# Patient Record
Sex: MALE | Race: WHITE | Employment: OTHER | URBAN - NONMETROPOLITAN AREA
[De-identification: names, ages, dates, MRNs, and addresses within clinical notes are randomized per-mention and may not be internally consistent; named-entity substitution may affect disease eponyms.]

---

## 2020-12-03 ENCOUNTER — NURSING HOME VISIT (OUTPATIENT)
Dept: FAMILY MEDICINE | Facility: CLINIC | Age: 85
End: 2020-12-03
Payer: MEDICARE

## 2020-12-03 DIAGNOSIS — J18.9 PNEUMONIA OF LEFT LOWER LOBE DUE TO INFECTIOUS ORGANISM: ICD-10-CM
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
## 2020-12-03 DIAGNOSIS — M25.552 HIP PAIN, LEFT: Primary | ICD-10-CM

## 2020-12-03 DIAGNOSIS — J43.1 PANLOBULAR EMPHYSEMA (H): ICD-10-CM

## 2020-12-03 DIAGNOSIS — Z86.16 HISTORY OF 2019 NOVEL CORONAVIRUS DISEASE (COVID-19): ICD-10-CM

## 2020-12-03 DIAGNOSIS — I10 ESSENTIAL HYPERTENSION: ICD-10-CM

## 2020-12-03 DIAGNOSIS — E11.9 TYPE 2 DIABETES MELLITUS WITHOUT COMPLICATION, WITHOUT LONG-TERM CURRENT USE OF INSULIN (H): ICD-10-CM

## 2020-12-03 DIAGNOSIS — M62.81 GENERALIZED MUSCLE WEAKNESS: ICD-10-CM

## 2020-12-03 PROCEDURE — 99306 1ST NF CARE HIGH MDM 50: CPT | Performed by: NURSE PRACTITIONER

## 2020-12-03 RX ORDER — BUDESONIDE AND FORMOTEROL FUMARATE DIHYDRATE 160; 4.5 UG/1; UG/1
2 AEROSOL RESPIRATORY (INHALATION) 2 TIMES DAILY
COMMUNITY

## 2020-12-03 RX ORDER — CARBOXYMETHYLCELLULOSE SODIUM 5 MG/ML
1 SOLUTION/ DROPS OPHTHALMIC PRN
COMMUNITY

## 2020-12-03 RX ORDER — BUDESONIDE 0.5 MG/2ML
0.5 INHALANT ORAL 2 TIMES DAILY
COMMUNITY
End: 2020-12-18

## 2020-12-03 RX ORDER — GUAIFENESIN 600 MG/1
600 TABLET, EXTENDED RELEASE ORAL 2 TIMES DAILY
COMMUNITY

## 2020-12-03 RX ORDER — SIMVASTATIN 10 MG
10 TABLET ORAL AT BEDTIME
COMMUNITY

## 2020-12-03 RX ORDER — LANOLIN ALCOHOL/MO/W.PET/CERES
6 CREAM (GRAM) TOPICAL AT BEDTIME
COMMUNITY

## 2020-12-03 RX ORDER — OXYCODONE HYDROCHLORIDE 10 MG/1
10 TABLET ORAL EVERY 6 HOURS PRN
Qty: 20 TABLET | Refills: 0 | Status: SHIPPED | OUTPATIENT
Start: 2020-12-03 | End: 2020-12-11

## 2020-12-03 RX ORDER — PREDNISONE 10 MG/1
10 TABLET ORAL
COMMUNITY

## 2020-12-03 RX ORDER — OXYCODONE HYDROCHLORIDE 10 MG/1
5 TABLET ORAL EVERY 6 HOURS PRN
COMMUNITY
End: 2020-12-03

## 2020-12-03 RX ORDER — ALBUTEROL SULFATE 90 UG/1
2 AEROSOL, METERED RESPIRATORY (INHALATION) 4 TIMES DAILY PRN
COMMUNITY

## 2020-12-03 RX ORDER — TAMSULOSIN HYDROCHLORIDE 0.4 MG/1
0.4 CAPSULE ORAL DAILY
COMMUNITY

## 2020-12-03 RX ORDER — SENNOSIDES A AND B 8.6 MG/1
1 TABLET, FILM COATED ORAL 2 TIMES DAILY PRN
COMMUNITY

## 2020-12-03 RX ORDER — DILTIAZEM HYDROCHLORIDE 180 MG/1
180 CAPSULE, EXTENDED RELEASE ORAL DAILY
COMMUNITY

## 2020-12-03 RX ORDER — ARFORMOTEROL TARTRATE 15 UG/2ML
15 SOLUTION RESPIRATORY (INHALATION) 2 TIMES DAILY
COMMUNITY
End: 2020-12-18

## 2020-12-03 RX ORDER — ACETAMINOPHEN 325 MG/1
325 TABLET ORAL PRN
COMMUNITY
End: 2020-12-11

## 2020-12-03 RX ORDER — OXYBUTYNIN CHLORIDE 5 MG/1
5 TABLET ORAL 2 TIMES DAILY
COMMUNITY
End: 2020-12-11

## 2020-12-03 RX ORDER — ALBUTEROL SULFATE 0.83 MG/ML
2.5 SOLUTION RESPIRATORY (INHALATION)
COMMUNITY
End: 2020-12-18

## 2020-12-03 ASSESSMENT — MIFFLIN-ST. JEOR: SCORE: 1296.75

## 2020-12-03 NOTE — LETTER
12/3/2020        RE: Magno Blackmon  [address]  [place] [address]        [facility]  PRIMARY CARE PROVIDER AND CLINIC:  Isha Lopez MD, [address] [place] [address] / [place] [address]  Chief Complaint   Patient presents with     NURSING HOME REQUEST     Admission     [facility] Medical Record Number:  [id number]  Place of Service where encounter took place:  [facility] [[id number]]    Magno Blackmon  is a 89 year old  (8/27/1930), admitted to the above facility from  [facility]. Hospital stay 11/28/20 through 11/30/20..  Admitted to this facility for  rehab.    HPI:    HPI information obtained from: facility chart records, facility staff and patient report.   Brief Summary of Hospital Course:   BRIEF HOSPITAL COURSE: This 89 y.o. male with a past medical history most significant for COPD, coronary disease, type 2 diabetes mellitus, hypertension, GERD, and dyslipidemia presented to the [facility] emergency room with generalized weakness. Ra was in his normal state of health until the middle of the night of presenting at which time he reports falling to his knees by his bed because he felt so weak. He was unable to get up from that position and ultimately EMS was summoned. Of note, Ra had a positive covid test on 11/17/20 and was hospitalized for a pneumonia here at [facility] from 11/16 to 11/18. Ra was saturating well on room air and did not need any supplemental oxygen. He remained quite weak, but did show steady improvement each day and subjectively felt more like himself as well. He will be discharged to [facility] in [place]. There are no new medications or medication changes for Ra.    Updates on Status Since Skilled nursing Admission:   Patient had a fall last night had- no injury initially except abrasion on bilateral knees   Denies pain then   Now today he is complaining of pain in left hip and decreased ROM   He has an order for oxycodone but no script was  sent     No other concerns expressed  He is eating well   Wants to discharge ASAP from rehab under the care of a friend Abigail     nebulizer changed to inhalers due to [employer] policy            CODE STATUS/ADVANCE DIRECTIVES DISCUSSION:   DNR / DNI  Patient's living condition: lives alone  ALLERGIES: Blood-group specific substance  PAST MEDICAL HISTORY:  has no past medical history on file.  PAST SURGICAL HISTORY:   has a past surgical history that includes REMV CATARACT EXTRACAP,INSERT LENS, W/O ECP (02/17/2005); REMV CATARACT EXTRACAP,INSERT LENS, W/O ECP (04/21/2005); rotator cuff repair rt/lt (Left); Cardiac Cath - HIM Scan (1996); Mediastinoscopy; and hernia repair (2012).  FAMILY HISTORY: family history is not on file.  SOCIAL HISTORY:       Post Discharge Medication Reconciliation Status: discharge medications reconciled, continue medications without change    Current Outpatient Medications   Medication Sig Dispense Refill     acetaminophen (TYLENOL) 325 MG tablet Take 325 mg by mouth as needed for mild pain 1 tablet for mild pain, 2 tablets for moderate pain every 4 hours as needed/max dose in 24 hours in 4000mg       albuterol (PROAIR HFA/PROVENTIL HFA/VENTOLIN HFA) 108 (90 Base) MCG/ACT inhaler Inhale 2 puffs into the lungs 4 times daily as needed for shortness of breath / dyspnea or wheezing       budesonide-formoterol (SYMBICORT) 160-4.5 MCG/ACT Inhaler Inhale 2 puffs into the lungs 2 times daily       calcium carbonate 600 mg-vitamin D 400 units (CALTRATE) 600-400 MG-UNIT per tablet Take 1 tablet by mouth 2 times daily       carboxymethylcellulose PF (REFRESH PLUS) 0.5 % ophthalmic solution Place 1 drop into both eyes as needed for dry eyes       Cholecalciferol (VITAMIN D-3) 125 MCG (5000 UT) TABS Take 5,000 Units by mouth daily       diltiazem ER (DILT-XR) 180 MG 24 hr capsule Take 180 mg by mouth daily       guaiFENesin (MUCINEX) 600 MG 12 hr tablet Take 1,200 mg by mouth 2 times daily       melatonin  "3 MG tablet Take 1 mg by mouth At Bedtime       metFORMIN (GLUCOPHAGE) 500 MG tablet Take 500 mg by mouth 2 times daily (with meals)       Multiple Vitamins-Minerals (PRESERVISION AREDS 2 PO) 2 capsules by mouth once daily       oxybutynin (DITROPAN) 5 MG tablet Take 5 mg by mouth 2 times daily       oxyCODONE IR (ROXICODONE) 10 MG tablet Take 1 tablet (10 mg) by mouth every 6 hours as needed for severe pain 20 tablet 0     predniSONE (DELTASONE) 10 MG tablet Take 10 mg by mouth daily       senna (SENOKOT) 8.6 MG tablet Take 1 tablet by mouth 2 times daily as needed for constipation       simvastatin (ZOCOR) 10 MG tablet Take 10 mg by mouth At Bedtime       tamsulosin (FLOMAX) 0.4 MG capsule Take 0.4 mg by mouth daily       albuterol (PROVENTIL) (2.5 MG/3ML) 0.083% neb solution Take 2.5 mg by nebulization every 2 hours as needed for shortness of breath / dyspnea or wheezing       arformoterol (BROVANA) 15 MCG/2ML NEBU neb solution Take 15 mcg by nebulization 2 times daily       budesonide (PULMICORT) 0.5 MG/2ML neb solution Take 0.5 mg by nebulization 2 times daily           ROS:  10 point ROS of systems including Constitutional, Eyes, Respiratory, Cardiovascular, Gastroenterology, Genitourinary, Integumentary, Musculoskeletal, Psychiatric were all negative except for pertinent positives noted in my HPI.    Vitals:  /63   Pulse 50   Temp 99  F (37.2  C)   Resp 19   Ht 1.676 m (5' 6\")   Wt 69.4 kg (153 lb)   SpO2 93%   BMI 24.69 kg/m    Exam:  GENERAL APPEARANCE:  Alert, in no distress  EYES:  EOM, conjunctivae, lids, pupils and irises normal  RESP:  respiratory effort and palpation of chest normal, lungs clear to auscultation , no respiratory distress  CV:  Palpation and auscultation of heart done , regular rate and rhythm, no murmur, rub, or gallop  ABDOMEN:  normal bowel sounds, soft, nontender, no hepatosplenomegaly or other masses  M/S:   decreased ROM of left hip due to pain   SKIN:  Inspection of " skin and subcutaneous tissue baseline, Palpation of skin and subcutaneous tissue baseline  NEURO:   Cranial nerves 2-12 are normal tested and grossly at patient's baseline  PSYCH:  oriented X 3, affect and mood normal    Lab/Diagnostic data:  Labs done in SNF are in [place] EPIC. Please refer to them using [employer]Care Everywhere.    ASSESSMENT/PLAN:  History of 2019 novel coronavirus disease (COVID-19)  Pneumonia of left lower lobe due to infectious organism  Recovered  Has post COVID weakness  Here for rehab     Generalized muscle weakness  Working with therapies     Hip pain, left  Fall, initial encounter  Will get xrayof left hip through [facility] to rule out an acute fracture   Sent RX for oxycodone at home dose   authorized this to be removed from e-kit     Type 2 diabetes mellitus without complication, without long-term current use of insulin (H)  Stable     Panlobular emphysema (H)  Stable     Pulmicort and brovana nebs on held and switched to symbicort due to policy in facility       Essential hypertension  Stable        Orders written by provider at facility  As above    Total time spent with patient visit at the skilled nursing facility was 35 min including patient visit and review of past records. Greater than 50% of total time spent with counseling and coordinating care due to new admssion.     Electronically signed by:  Kelly Abebe NP                           Sincerely,        Kelly Abebe NP

## 2020-12-03 NOTE — PROGRESS NOTES
[facility]  PRIMARY CARE PROVIDER AND CLINIC:  Isha Lopez MD, [address] / [place] [address]  Chief Complaint   Patient presents with     NURSING HOME REQUEST     Admission     [facility] Medical Record Number:  [id number]  Place of Service where encounter took place:  [facility] ([facility] [[id number]]    Magno Blackmon  is a 89 year old  (8/27/1930), admitted to the above facility from  [facility]. Hospital stay 11/28/20 through 11/30/20..  Admitted to this facility for  rehab.    HPI:    HPI information obtained from: facility chart records, facility staff and patient report.   Brief Summary of Hospital Course:   BRIEF HOSPITAL COURSE: This 89 y.o. male with a past medical history most significant for COPD, coronary disease, type 2 diabetes mellitus, hypertension, GERD, and dyslipidemia presented to the [facility] emergency room with generalized weakness. Ra was in his normal state of health until the middle of the night of presenting at which time he reports falling to his knees by his bed because he felt so weak. He was unable to get up from that position and ultimately EMS was summoned. Of note, Ra had a positive covid test on 11/17/20 and was hospitalized for a pneumonia here at [facility] from 11/16 to 11/18. Ra was saturating well on room air and did not need any supplemental oxygen. He remained quite weak, but did show steady improvement each day and subjectively felt more like himself as well. He will be discharged to [facility] in [place]. There are no new medications or medication changes for Ra.    Updates on Status Since Skilled nursing Admission:   Patient had a fall last night had- no injury initially except abrasion on bilateral knees   Denies pain then   Now today he is complaining of pain in left hip and decreased ROM   He has an order for oxycodone but no script was sent     No other concerns expressed  He is eating well   Wants to discharge ASAP from  rehab under the care of a friend Abigail     nebulizer changed to inhalers due to [employer] policy            CODE STATUS/ADVANCE DIRECTIVES DISCUSSION:   DNR / DNI  Patient's living condition: lives alone  ALLERGIES: Blood-group specific substance  PAST MEDICAL HISTORY:  has no past medical history on file.  PAST SURGICAL HISTORY:   has a past surgical history that includes REMV CATARACT EXTRACAP,INSERT LENS, W/O ECP (02/17/2005); REMV CATARACT EXTRACAP,INSERT LENS, W/O ECP (04/21/2005); rotator cuff repair rt/lt (Left); Cardiac Cath - HIM Scan (1996); Mediastinoscopy; and hernia repair (2012).  FAMILY HISTORY: family history is not on file.  SOCIAL HISTORY:       Post Discharge Medication Reconciliation Status: discharge medications reconciled, continue medications without change    Current Outpatient Medications   Medication Sig Dispense Refill     acetaminophen (TYLENOL) 325 MG tablet Take 325 mg by mouth as needed for mild pain 1 tablet for mild pain, 2 tablets for moderate pain every 4 hours as needed/max dose in 24 hours in 4000mg       albuterol (PROAIR HFA/PROVENTIL HFA/VENTOLIN HFA) 108 (90 Base) MCG/ACT inhaler Inhale 2 puffs into the lungs 4 times daily as needed for shortness of breath / dyspnea or wheezing       budesonide-formoterol (SYMBICORT) 160-4.5 MCG/ACT Inhaler Inhale 2 puffs into the lungs 2 times daily       calcium carbonate 600 mg-vitamin D 400 units (CALTRATE) 600-400 MG-UNIT per tablet Take 1 tablet by mouth 2 times daily       carboxymethylcellulose PF (REFRESH PLUS) 0.5 % ophthalmic solution Place 1 drop into both eyes as needed for dry eyes       Cholecalciferol (VITAMIN D-3) 125 MCG (5000 UT) TABS Take 5,000 Units by mouth daily       diltiazem ER (DILT-XR) 180 MG 24 hr capsule Take 180 mg by mouth daily       guaiFENesin (MUCINEX) 600 MG 12 hr tablet Take 1,200 mg by mouth 2 times daily       melatonin 3 MG tablet Take 1 mg by mouth At Bedtime       metFORMIN (GLUCOPHAGE) 500 MG tablet  "Take 500 mg by mouth 2 times daily (with meals)       Multiple Vitamins-Minerals (PRESERVISION AREDS 2 PO) 2 capsules by mouth once daily       oxybutynin (DITROPAN) 5 MG tablet Take 5 mg by mouth 2 times daily       oxyCODONE IR (ROXICODONE) 10 MG tablet Take 1 tablet (10 mg) by mouth every 6 hours as needed for severe pain 20 tablet 0     predniSONE (DELTASONE) 10 MG tablet Take 10 mg by mouth daily       senna (SENOKOT) 8.6 MG tablet Take 1 tablet by mouth 2 times daily as needed for constipation       simvastatin (ZOCOR) 10 MG tablet Take 10 mg by mouth At Bedtime       tamsulosin (FLOMAX) 0.4 MG capsule Take 0.4 mg by mouth daily       albuterol (PROVENTIL) (2.5 MG/3ML) 0.083% neb solution Take 2.5 mg by nebulization every 2 hours as needed for shortness of breath / dyspnea or wheezing       arformoterol (BROVANA) 15 MCG/2ML NEBU neb solution Take 15 mcg by nebulization 2 times daily       budesonide (PULMICORT) 0.5 MG/2ML neb solution Take 0.5 mg by nebulization 2 times daily           ROS:  10 point ROS of systems including Constitutional, Eyes, Respiratory, Cardiovascular, Gastroenterology, Genitourinary, Integumentary, Musculoskeletal, Psychiatric were all negative except for pertinent positives noted in my HPI.    Vitals:  /63   Pulse 50   Temp 99  F (37.2  C)   Resp 19   Ht 1.676 m (5' 6\")   Wt 69.4 kg (153 lb)   SpO2 93%   BMI 24.69 kg/m    Exam:  GENERAL APPEARANCE:  Alert, in no distress  EYES:  EOM, conjunctivae, lids, pupils and irises normal  RESP:  respiratory effort and palpation of chest normal, lungs clear to auscultation , no respiratory distress  CV:  Palpation and auscultation of heart done , regular rate and rhythm, no murmur, rub, or gallop  ABDOMEN:  normal bowel sounds, soft, nontender, no hepatosplenomegaly or other masses  M/S:   decreased ROM of left hip due to pain   SKIN:  Inspection of skin and subcutaneous tissue baseline, Palpation of skin and subcutaneous tissue " baseline  NEURO:   Cranial nerves 2-12 are normal tested and grossly at patient's baseline  PSYCH:  oriented X 3, affect and mood normal    Lab/Diagnostic data:  Labs done in SNF are in [place] EPIC. Please refer to them using [employer]Care Everywhere.    ASSESSMENT/PLAN:  History of 2019 novel coronavirus disease (COVID-19)  Pneumonia of left lower lobe due to infectious organism  Recovered  Has post COVID weakness  Here for rehab     Generalized muscle weakness  Working with therapies     Hip pain, left  Fall, initial encounter  Will get xrayof left hip through [facility] to rule out an acute fracture   Sent RX for oxycodone at home dose   authorized this to be removed from e-kit     Type 2 diabetes mellitus without complication, without long-term current use of insulin (H)  Stable     Panlobular emphysema (H)  Stable     Pulmicort and brovana nebs on held and switched to symbicort due to policy in facility       Essential hypertension  Stable        Orders written by provider at facility  As above    Total time spent with patient visit at the skilled nursing facility was 35 min including patient visit and review of past records. Greater than 50% of total time spent with counseling and coordinating care due to new admssion.     Electronically signed by:  Kelly Abebe NP

## 2020-12-04 VITALS
HEART RATE: 50 BPM | RESPIRATION RATE: 19 BRPM | TEMPERATURE: 99 F | SYSTOLIC BLOOD PRESSURE: 129 MMHG | WEIGHT: 153 LBS | BODY MASS INDEX: 24.59 KG/M2 | DIASTOLIC BLOOD PRESSURE: 63 MMHG | OXYGEN SATURATION: 93 % | HEIGHT: 66 IN

## 2020-12-04 PROBLEM — R53.1 GENERALIZED WEAKNESS: Status: ACTIVE | Noted: 2020-11-28

## 2020-12-04 PROBLEM — J18.9 PNEUMONIA OF LEFT LOWER LOBE DUE TO INFECTIOUS ORGANISM: Status: ACTIVE | Noted: 2020-11-17

## 2020-12-04 PROBLEM — N40.0 HYPERTROPHY OF PROSTATE WITHOUT URINARY OBSTRUCTION: Status: ACTIVE | Noted: 2020-12-04

## 2020-12-04 PROBLEM — J44.1 COPD WITH ACUTE EXACERBATION (H): Status: ACTIVE | Noted: 2019-09-17

## 2020-12-07 ENCOUNTER — NURSING HOME VISIT (OUTPATIENT)
Dept: GERIATRICS | Facility: CLINIC | Age: 85
End: 2020-12-07
Payer: MEDICARE

## 2020-12-07 ENCOUNTER — DOCUMENTATION ONLY (OUTPATIENT)
Dept: OTHER | Facility: CLINIC | Age: 85
End: 2020-12-07

## 2020-12-07 VITALS
DIASTOLIC BLOOD PRESSURE: 54 MMHG | OXYGEN SATURATION: 92 % | TEMPERATURE: 96.8 F | WEIGHT: 149 LBS | HEIGHT: 66 IN | RESPIRATION RATE: 19 BRPM | SYSTOLIC BLOOD PRESSURE: 128 MMHG | BODY MASS INDEX: 23.95 KG/M2 | HEART RATE: 80 BPM

## 2020-12-07 DIAGNOSIS — J43.2 CENTRILOBULAR EMPHYSEMA (H): ICD-10-CM

## 2020-12-07 DIAGNOSIS — M62.81 MUSCLE WEAKNESS (GENERALIZED): Primary | ICD-10-CM

## 2020-12-07 DIAGNOSIS — E11.9 TYPE 2 DIABETES MELLITUS WITHOUT COMPLICATION, WITHOUT LONG-TERM CURRENT USE OF INSULIN (H): ICD-10-CM

## 2020-12-07 DIAGNOSIS — M25.552 HIP PAIN, LEFT: ICD-10-CM

## 2020-12-07 PROCEDURE — 99308 SBSQ NF CARE LOW MDM 20: CPT | Performed by: NURSE PRACTITIONER

## 2020-12-07 ASSESSMENT — MIFFLIN-ST. JEOR: SCORE: 1278.61

## 2020-12-07 NOTE — LETTER
"    12/7/2020        RE: Magno Blackmon  [address] [facility] [address]        [facility]  [facility] Medical Record Number:  [id number]  Place of Service where encounter took place:  [facility] ([facility] [[id number]]  Chief Complaint   Patient presents with     Nursing Home Acute     HPI:    Magno Blackmon  is a 89 year old (8/27/1930), who is being seen today for an episodic care visit.  HPI information obtained from: facility chart records, facility staff, patient report and [facility] chart review.   Rehabbing s/p [facility] hospital stay 11/28 - 11/30 for generalized weakness (+ COVID 11/17 and [facility] hospitalization for pneumonia 11/16 - 11/18. Noted from hospital he did not need O2, but wearing O2 now. He states Chronic O2 at night. He wants to go home ASAP, but notes he is weaker than he should be. Noted fall on 12/2 at SNF> X ray done of hip negative.     Past Medical and Surgical History reviewed in Epic today.  MEDICATIONS: Reviewed.    REVIEW OF SYSTEMS:  4 point ROS including Respiratory, CV, GI and , other than that noted in the HPI,  is negative    Objective:  /54   Pulse 80   Temp 96.8  F (36  C)   Resp 19   Ht 1.676 m (5' 6\")   Wt 67.6 kg (149 lb)   SpO2 92%   BMI 24.05 kg/m    Exam:  GENERAL APPEARANCE:  Alert, in no distress  RESP:  respiratory effort and palpation of chest normal, auscultation of lungs clear/diminished , no respiratory distress  CV:  Palpation and auscultation of heart done , rate and rhythm reg, no murmur, no  peripheral edema  ABDOMEN:  normal bowel sounds, soft, nontender, no hepatosplenomegaly or other masses  M/S:   Gait and station SBA, Digits and nails intact  SKIN:  Inspection and Palpation of skin and subcutaneous tissue intact  NEURO: 2-12 in normal limits and at patient's baseline  PSYCH:  insight and judgement, memory intact , affect and mood Pleasant     Labs:   Recent labs in [facility] reviewed by me today. "     ASSESSMENT/PLAN:  Muscle weakness (generalized)  Improving - cont PT/OT    Hip pain, left  Improving - will get off oxy before sNF DC    Centrilobular emphysema (H)  Stable - no change    Type 2 diabetes mellitus without complication, without long-term current use of insulin (H)  accuchecks revised by me and stable - occ elevated post t/o day - but most fastings under 160    No new orders.     Electronically signed by:  JAYLENE Celaya CNP                 Sincerely,        JAYLENE Celaya CNP

## 2020-12-07 NOTE — PROGRESS NOTES
"[facility]  [facility] Medical Record Number:  [id number]  Place of Service where encounter took place:  [facility] ([facility] [[id number]]  Chief Complaint   Patient presents with     Nursing Home Acute     HPI:    Magno Blackmon  is a 89 year old (8/27/1930), who is being seen today for an episodic care visit.  HPI information obtained from: facility chart records, facility staff, patient report and [facility] chart review.   Rehabbing s/p [facility] hospital stay 11/28 - 11/30 for generalized weakness (+ COVID 11/17 and [facility] hospitalization for pneumonia 11/16 - 11/18. Noted from hospital he did not need O2, but wearing O2 now. He states Chronic O2 at night. He wants to go home ASAP, but notes he is weaker than he should be. Noted fall on 12/2 at SNF> X ray done of hip negative.     Past Medical and Surgical History reviewed in Epic today.  MEDICATIONS: Reviewed.    REVIEW OF SYSTEMS:  4 point ROS including Respiratory, CV, GI and , other than that noted in the HPI,  is negative    Objective:  /54   Pulse 80   Temp 96.8  F (36  C)   Resp 19   Ht 1.676 m (5' 6\")   Wt 67.6 kg (149 lb)   SpO2 92%   BMI 24.05 kg/m    Exam:  GENERAL APPEARANCE:  Alert, in no distress  RESP:  respiratory effort and palpation of chest normal, auscultation of lungs clear/diminished , no respiratory distress  CV:  Palpation and auscultation of heart done , rate and rhythm reg, no murmur, no  peripheral edema  ABDOMEN:  normal bowel sounds, soft, nontender, no hepatosplenomegaly or other masses  M/S:   Gait and station SBA, Digits and nails intact  SKIN:  Inspection and Palpation of skin and subcutaneous tissue intact  NEURO: 2-12 in normal limits and at patient's baseline  PSYCH:  insight and judgement, memory intact , affect and mood Pleasant     Labs:   Recent labs in [facility] reviewed by me today.     ASSESSMENT/PLAN:  Muscle weakness (generalized)  Improving - cont PT/OT    Hip pain, left  Improving - " will get off oxy before sNF DC    Centrilobular emphysema (H)  Stable - no change    Type 2 diabetes mellitus without complication, without long-term current use of insulin (H)  accuchecks revised by me and stable - occ elevated post t/o day - but most fastings under 160    No new orders.     Electronically signed by:  JAYLENE Celaya CNP

## 2020-12-10 ENCOUNTER — HOSPITAL LABORATORY (OUTPATIENT)
Facility: OTHER | Age: 85
End: 2020-12-10

## 2020-12-10 LAB
ANION GAP SERPL CALCULATED.3IONS-SCNC: 8 MMOL/L (ref 3–14)
BUN SERPL-MCNC: 12 MG/DL (ref 7–30)
CALCIUM SERPL-MCNC: 9 MG/DL (ref 8.5–10.1)
CHLORIDE SERPL-SCNC: 99 MMOL/L (ref 94–109)
CO2 SERPL-SCNC: 28 MMOL/L (ref 20–32)
CREAT SERPL-MCNC: 0.84 MG/DL (ref 0.66–1.25)
ERYTHROCYTE [DISTWIDTH] IN BLOOD BY AUTOMATED COUNT: 16.3 % (ref 10–15)
GFR SERPL CREATININE-BSD FRML MDRD: 77 ML/MIN/{1.73_M2}
GLUCOSE SERPL-MCNC: 162 MG/DL (ref 70–99)
HCT VFR BLD AUTO: 25.5 % (ref 40–53)
HGB BLD-MCNC: 8.2 G/DL (ref 13.3–17.7)
MCH RBC QN AUTO: 27 PG (ref 26.5–33)
MCHC RBC AUTO-ENTMCNC: 32.2 G/DL (ref 31.5–36.5)
MCV RBC AUTO: 84 FL (ref 78–100)
PLATELET # BLD AUTO: 223 10E9/L (ref 150–450)
POTASSIUM SERPL-SCNC: 3.4 MMOL/L (ref 3.4–5.3)
RBC # BLD AUTO: 3.04 10E12/L (ref 4.4–5.9)
SODIUM SERPL-SCNC: 135 MMOL/L (ref 133–144)
WBC # BLD AUTO: 7 10E9/L (ref 4–11)

## 2020-12-11 ENCOUNTER — NURSING HOME VISIT (OUTPATIENT)
Dept: GERIATRICS | Facility: CLINIC | Age: 85
End: 2020-12-11
Payer: MEDICARE

## 2020-12-11 ENCOUNTER — TELEPHONE (OUTPATIENT)
Dept: GERIATRICS | Facility: CLINIC | Age: 85
End: 2020-12-11

## 2020-12-11 VITALS
HEIGHT: 66 IN | OXYGEN SATURATION: 98 % | TEMPERATURE: 98.3 F | DIASTOLIC BLOOD PRESSURE: 60 MMHG | HEART RATE: 98 BPM | RESPIRATION RATE: 18 BRPM | SYSTOLIC BLOOD PRESSURE: 136 MMHG | BODY MASS INDEX: 23.95 KG/M2 | WEIGHT: 149 LBS

## 2020-12-11 DIAGNOSIS — N32.81 OAB (OVERACTIVE BLADDER): ICD-10-CM

## 2020-12-11 DIAGNOSIS — M62.81 MUSCLE WEAKNESS (GENERALIZED): Primary | ICD-10-CM

## 2020-12-11 DIAGNOSIS — M25.552 HIP PAIN, LEFT: ICD-10-CM

## 2020-12-11 DIAGNOSIS — D64.9 ANEMIA, UNSPECIFIED TYPE: ICD-10-CM

## 2020-12-11 DIAGNOSIS — F51.01 PRIMARY INSOMNIA: ICD-10-CM

## 2020-12-11 PROCEDURE — 99309 SBSQ NF CARE MODERATE MDM 30: CPT | Performed by: NURSE PRACTITIONER

## 2020-12-11 RX ORDER — OXYCODONE HYDROCHLORIDE 10 MG/1
10 TABLET ORAL EVERY 6 HOURS PRN
Qty: 20 TABLET | Refills: 0 | Status: SHIPPED | OUTPATIENT
Start: 2020-12-11 | End: 2020-12-18

## 2020-12-11 RX ORDER — ACETAMINOPHEN 500 MG
1000 TABLET ORAL 2 TIMES DAILY PRN
COMMUNITY
End: 2020-12-18

## 2020-12-11 RX ORDER — ACETAMINOPHEN 500 MG
1000 TABLET ORAL 3 TIMES DAILY
COMMUNITY

## 2020-12-11 ASSESSMENT — MIFFLIN-ST. JEOR: SCORE: 1278.61

## 2020-12-11 NOTE — PROGRESS NOTES
"[facility]    Chief Complaint   Patient presents with     Nursing Home Acute     HPI:    Magno Blackmon is a 89 year old  (8/27/1930), who is being seen today for an episodic care visit at: [facility] ([facility]) [[id number]]  Today's concern is: L  Hip pain and f/u labs.   Ra is rehabbing s/p [facility] hospital stay 11/28 - 11/30 for generalized weakness (+ COVID 11/17 and [facility] hospitalization for pneumonia 11/16 - 11/18. Noted from hospital he did not need O2, but wearing O2 now. He states Chronic O2 at night. He wants to go home ASAP, but notes he is weaker than he should be. Noted fall on 12/2 at SNF> X ray done of hip negative.     SNF updates:  12/3: X ray on 12/3 - negative.     F/u today - he states he isn't sleeping well - doesn't have energy during the day and still c/o L hip pain.     PMH/PSH reviewed in EPIC today  REVIEW OF SYSTEMS:  4 point ROS including Respiratory, CV, GI and , other than that noted in the HPI,  is negative    /60   Pulse 98   Temp 98.3  F (36.8  C)   Resp 18   Ht 1.676 m (5' 6\")   Wt 67.6 kg (149 lb)   SpO2 98%   BMI 24.05 kg/m    GENERAL APPEARANCE:  Alert, in no distress- very tired today  RESP:  respiratory effort and palpation of chest normal, auscultation of lungs clear , no respiratory distress  CV:  Palpation and auscultation of heart done , rate and rhythm reg, no murmur, no peripheral edema  ABDOMEN:  normal bowel sounds, soft, nontender, no hepatosplenomegaly or other masses  M/S:   Gait and station He is still having pain in L leg when wt bearing. When laying down I can move his hip in any direction (extension, flexion, abduction and adduction)  - no pain, but when he tries to extend and push against my arm when laying down - it hurts. , Digits and nails intact  SKIN:  Inspection and Palpation of skin and subcutaneous tissue intact  NEURO: 2-12 in normal limits and at patient's baseline  PSYCH:  insight and judgement, memory intact , " affect and mood [facility] on 12/10/2020   Component Date Value Ref Range Status     Sodium 12/10/2020 135  133 - 144 mmol/L Final     Potassium 12/10/2020 3.4  3.4 - 5.3 mmol/L Final     Chloride 12/10/2020 99  94 - 109 mmol/L Final     Carbon Dioxide 12/10/2020 28  20 - 32 mmol/L Final     Anion Gap 12/10/2020 8  3 - 14 mmol/L Final     Glucose 12/10/2020 162* 70 - 99 mg/dL Final     Urea Nitrogen 12/10/2020 12  7 - 30 mg/dL Final     Creatinine 12/10/2020 0.84  0.66 - 1.25 mg/dL Final     GFR Estimate 12/10/2020 77  >60 mL/min/[1.73_m2] Final    Comment: Non  GFR Calc  Starting 12/18/2018, serum creatinine based estimated GFR (eGFR) will be   calculated using the Chronic Kidney Disease Epidemiology Collaboration   (CKD-EPI) equation.       GFR Estimate If Black 12/10/2020 89  >60 mL/min/[1.73_m2] Final    Comment:  GFR Calc  Starting 12/18/2018, serum creatinine based estimated GFR (eGFR) will be   calculated using the Chronic Kidney Disease Epidemiology Collaboration   (CKD-EPI) equation.       Calcium 12/10/2020 9.0  8.5 - 10.1 mg/dL Final     WBC 12/10/2020 7.0  4.0 - 11.0 10e9/L Final     RBC Count 12/10/2020 3.04* 4.4 - 5.9 10e12/L Final     Hemoglobin 12/10/2020 8.2* 13.3 - 17.7 g/dL Final     Hematocrit 12/10/2020 25.5* 40.0 - 53.0 % Final     MCV 12/10/2020 84  78 - 100 fl Final     MCH 12/10/2020 27.0  26.5 - 33.0 pg Final     MCHC 12/10/2020 32.2  31.5 - 36.5 g/dL Final     RDW 12/10/2020 16.3* 10.0 - 15.0 % Final     Platelet Count 12/10/2020 223  150 - 450 10e9/L Final     .     Assessment/Plan:  Muscle weakness (generalized) and Hip pain, left  Cont PT.   Will have [facility] re eval X ray given pain. Add increased Tylenol.     Primary insomnia  Will trial increase melatonin    OAB (overactive bladder)  Will stop ditropan given risks of anticholenergic.     Anemia, unspecified type  Notable decrease - worrisome - will check guiacs and recheck HGB  next lab day  Not on blood thinners.     Orders:  1. Increase melatonin 6 mg po q HS  2. discontinue oxybutynin  3. Change tyelnol 1000 mg po BID and BID PRN  4. Guiac stools x 3  5. HGB next lab day dx anemia  6. Start protonix 40 mg po BID - Dx GIB likely.     Electronically signed by:  JAYLENE Celaya CNP

## 2020-12-11 NOTE — TELEPHONE ENCOUNTER
Ortho Nursing home visit    Magno Blackmon is a 89 year old male who resides at [facility]    Patient has x-rays reviewed for Left hip pain and weakness. Imaging was done on site to R/O occult fracture:      No past medical history on file.   Past Surgical History:   Procedure Laterality Date     CARDIAC CATH - HIM SCAN  1996     HC REMV CATARACT EXTRACAP,INSERT LENS, W/O ECP  02/17/2005    Left      HC REMV CATARACT EXTRACAP,INSERT LENS, W/O ECP  04/21/2005    Right     HERNIA REPAIR  2012    umbilical     MEDIASTINOSCOPY      2009     ROTATOR CUFF REPAIR RT/LT Left         Allergies   Allergen Reactions     Blood-Group Specific Substance Other (See Comments)     Patient has Anti-E.  Blood Product orders may be delayed.  Draw one red top and two purple top tubes for ALL Type and Screen/ Type and Crossmatch orders.       There were no vitals taken for this visit.         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 Will continue to follow.    [id number]           Jaya OPA-C  [phone] Cell

## 2020-12-11 NOTE — LETTER
"    12/11/2020        RE: Magno Blackmon  [address] [facility] [address]        [place] [facility]    Chief Complaint   Patient presents with     Nursing Home Acute     HPI:    Magno Blackmon is a 89 year old  (8/27/1930), who is being seen today for an episodic care visit at: [facility] (S) [[id number]]  Today's concern is: L  Hip pain and f/u labs.   Bill is rehabbing s/p [facility] hospital stay 11/28 - 11/30 for generalized weakness (+ COVID 11/17 and [facility] hospitalization for pneumonia 11/16 - 11/18. Noted from hospital he did not need O2, but wearing O2 now. He states Chronic O2 at night. He wants to go home ASAP, but notes he is weaker than he should be. Noted fall on 12/2 at SNF> X ray done of hip negative.     SNF updates:  12/3: X ray on 12/3 - negative.     F/u today - he states he isn't sleeping well - doesn't have energy during the day and still c/o L hip pain.     PMH/PSH reviewed in EPIC today  REVIEW OF SYSTEMS:  4 point ROS including Respiratory, CV, GI and , other than that noted in the HPI,  is negative    /60   Pulse 98   Temp 98.3  F (36.8  C)   Resp 18   Ht 1.676 m (5' 6\")   Wt 67.6 kg (149 lb)   SpO2 98%   BMI 24.05 kg/m    GENERAL APPEARANCE:  Alert, in no distress- very tired today  RESP:  respiratory effort and palpation of chest normal, auscultation of lungs clear , no respiratory distress  CV:  Palpation and auscultation of heart done , rate and rhythm reg, no murmur, no peripheral edema  ABDOMEN:  normal bowel sounds, soft, nontender, no hepatosplenomegaly or other masses  M/S:   Gait and station He is still having pain in L leg when wt bearing. When laying down I can move his hip in any direction (extension, flexion, abduction and adduction)  - no pain, but when he tries to extend and push against my arm when laying down - it hurts. , Digits and nails intact  SKIN:  Inspection and Palpation of skin and subcutaneous tissue intact  NEURO: 2-12 in normal " limits and at patient's baseline  PSYCH:  insight and judgement, memory intact , affect and mood [facility] Laboratory on 12/10/2020   Component Date Value Ref Range Status     Sodium 12/10/2020 135  133 - 144 mmol/L Final     Potassium 12/10/2020 3.4  3.4 - 5.3 mmol/L Final     Chloride 12/10/2020 99  94 - 109 mmol/L Final     Carbon Dioxide 12/10/2020 28  20 - 32 mmol/L Final     Anion Gap 12/10/2020 8  3 - 14 mmol/L Final     Glucose 12/10/2020 162* 70 - 99 mg/dL Final     Urea Nitrogen 12/10/2020 12  7 - 30 mg/dL Final     Creatinine 12/10/2020 0.84  0.66 - 1.25 mg/dL Final     GFR Estimate 12/10/2020 77  >60 mL/min/[1.73_m2] Final    Comment: Non  GFR Calc  Starting 12/18/2018, serum creatinine based estimated GFR (eGFR) will be   calculated using the Chronic Kidney Disease Epidemiology Collaboration   (CKD-EPI) equation.       GFR Estimate If Black 12/10/2020 89  >60 mL/min/[1.73_m2] Final    Comment:  GFR Calc  Starting 12/18/2018, serum creatinine based estimated GFR (eGFR) will be   calculated using the Chronic Kidney Disease Epidemiology Collaboration   (CKD-EPI) equation.       Calcium 12/10/2020 9.0  8.5 - 10.1 mg/dL Final     WBC 12/10/2020 7.0  4.0 - 11.0 10e9/L Final     RBC Count 12/10/2020 3.04* 4.4 - 5.9 10e12/L Final     Hemoglobin 12/10/2020 8.2* 13.3 - 17.7 g/dL Final     Hematocrit 12/10/2020 25.5* 40.0 - 53.0 % Final     MCV 12/10/2020 84  78 - 100 fl Final     MCH 12/10/2020 27.0  26.5 - 33.0 pg Final     MCHC 12/10/2020 32.2  31.5 - 36.5 g/dL Final     RDW 12/10/2020 16.3* 10.0 - 15.0 % Final     Platelet Count 12/10/2020 223  150 - 450 10e9/L Final     .     Assessment/Plan:  Muscle weakness (generalized) and Hip pain, left  Cont PT.   Will have [facility] re eval X ray given pain. Add increased Tylenol.     Primary insomnia  Will trial increase melatonin    OAB (overactive bladder)  Will stop ditropan given risks of anticholenergic.     Anemia,  unspecified type  Notable decrease - worrisome - will check guiacs and recheck HGB next lab day  Not on blood thinners.     Orders:  1. Increase melatonin 6 mg po q HS  2. discontinue oxybutynin  3. Change tyelnol 1000 mg po BID and BID PRN  4. Guiac stools x 3  5. HGB next lab day dx anemia  6. Start protonix 40 mg po BID - Dx GIB likely.     Electronically signed by:  JAYLENE Celaya CNP          Sincerely,        JAYLENE Celaya CNP

## 2020-12-14 ENCOUNTER — NURSING HOME VISIT (OUTPATIENT)
Dept: GERIATRICS | Facility: CLINIC | Age: 85
End: 2020-12-14
Payer: MEDICARE

## 2020-12-14 VITALS
HEIGHT: 66 IN | DIASTOLIC BLOOD PRESSURE: 67 MMHG | HEART RATE: 101 BPM | BODY MASS INDEX: 23.83 KG/M2 | TEMPERATURE: 97.2 F | RESPIRATION RATE: 19 BRPM | WEIGHT: 148.3 LBS | OXYGEN SATURATION: 96 % | SYSTOLIC BLOOD PRESSURE: 126 MMHG

## 2020-12-14 DIAGNOSIS — F51.01 PRIMARY INSOMNIA: ICD-10-CM

## 2020-12-14 DIAGNOSIS — N32.81 OAB (OVERACTIVE BLADDER): ICD-10-CM

## 2020-12-14 DIAGNOSIS — J43.2 CENTRILOBULAR EMPHYSEMA (H): ICD-10-CM

## 2020-12-14 DIAGNOSIS — M25.552 HIP PAIN, LEFT: ICD-10-CM

## 2020-12-14 DIAGNOSIS — E11.9 TYPE 2 DIABETES MELLITUS WITHOUT COMPLICATION, WITHOUT LONG-TERM CURRENT USE OF INSULIN (H): ICD-10-CM

## 2020-12-14 DIAGNOSIS — D64.9 ANEMIA, UNSPECIFIED TYPE: ICD-10-CM

## 2020-12-14 DIAGNOSIS — M62.81 MUSCLE WEAKNESS (GENERALIZED): Primary | ICD-10-CM

## 2020-12-14 PROCEDURE — 99309 SBSQ NF CARE MODERATE MDM 30: CPT | Performed by: NURSE PRACTITIONER

## 2020-12-14 ASSESSMENT — MIFFLIN-ST. JEOR: SCORE: 1275.43

## 2020-12-14 NOTE — LETTER
"    12/14/2020        RE: Magno Blackmon  [address]  [facility] [address]        [place] [facility]    Chief Complaint   Patient presents with     Nursing Home Acute     HPI:    Magno Blackmon is a 89 year old  (8/27/1930), who is being seen today for an episodic care visit at: [facility] ([facility] [[id number]]  Today's concern is f/u on Pain and Anemia - suspect GI loss.   Bill is rehabbing s/p [facility] hospital stay 11/28 - 11/30 for generalized weakness (+ COVID 11/17 and [facility] hospitalization for pneumonia 11/16 - 11/18. Noted from hospital he did not need O2, but wearing O2 now. He states Chronic O2 at night. He wants to go home ASAP, but notes he is weaker than he should be. Noted fall on 12/2 at SNF> X ray done of hip negative.      SNF updates:  12/3: X ray on 12/3 - negative.   12/11: increase melatonin from 3 to 6, stop oxybutynin, change tylenol to 1000 bid and bid prn, guaic stools x 3, start Protonix 40 bid and HGB recheck next lab day    Today he notes he is sleeping slightly better and having less pain.   Notable slight increase in HR listed today. Noted still on Prednisone 10 mg pq day.     PMH/PSH reviewed in EPIC today  REVIEW OF SYSTEMS:  4 point ROS including Respiratory, CV, GI and , other than that noted in the HPI,  is negative    /67   Pulse 101   Temp 97.2  F (36.2  C)   Resp 19   Ht 1.676 m (5' 6\")   Wt 67.3 kg (148 lb 4.8 oz)   SpO2 96%   BMI 23.94 kg/m    GENERAL APPEARANCE:  Alert, in no distress, still appears quite weak/frail  RESP:  respiratory effort and palpation of chest normal, auscultation of lungs clear , no respiratory distress  CV:  Palpation and auscultation of heart done , rate and rhythm reg, no murmur, no peripheral edema  ABDOMEN:  normal bowel sounds, soft, nontender, no hepatosplenomegaly or other masses  M/S:   Gait and station weak - unsteady - 4 WW, Digits and nails intact  SKIN:  Inspection and Palpation of skin and subcutaneous tissue " intact  NEURO: 2-12 in normal limits and at patient's baseline  PSYCH:  insight and judgement, memory intact , affect and mood Pleasant     Hemoglobin   Date Value Ref Range Status   12/10/2020 8.2 (L) 13.3 - 17.7 g/dL Final         Assessment/Plan:  Muscle weakness (generalized) and Hip pain, left  Noted [facility] recommendation for increase Pred - but given GI loss and suspect GI bleed - wont' increase now, but keep at 10 -  Does appear improved given scheduled Tyelnnol and continued PT    Primary insomnia  Slightly improved with Tyelenol and Melatonin - cont    OAB (overactive bladder)  No s/e of stopping ditropan - keep off given risks    Anemia, unspecified type  Worrisome - will recehck - keep PPI on   If drops further - would stop/wean prednisone.     Type 2 diabetes mellitus without complication, without long-term current use of insulin (H)  Noted [id number] lower than needed, but not on medications - can stop checks    Centrilobular emphysema (H)  Improved - could stop prednisone if needed due to GI loss  - will f/u on Friday    No new orders today but f/u on Friday and assess risk vs Benefit of prednisone given next HGB/ guaic results.     Electronically signed by:  JAYLENE Celaya CNP          Sincerely,        JAYLENE Celaya CNP

## 2020-12-14 NOTE — PROGRESS NOTES
"[place] [facility]    Chief Complaint   Patient presents with     Nursing Home Acute     HPI:    Magno Blackmon is a 89 year old  (8/27/1930), who is being seen today for an episodic care visit at: [facility] (S) [[id number]]  Today's concern is f/u on Pain and Anemia - suspect GI loss.   Bill is rehabbing s/p [facility] hospital stay 11/28 - 11/30 for generalized weakness (+ COVID 11/17 and [facility] hospitalization for pneumonia 11/16 - 11/18. Noted from hospital he did not need O2, but wearing O2 now. He states Chronic O2 at night. He wants to go home ASAP, but notes he is weaker than he should be. Noted fall on 12/2 at SNF> X ray done of hip negative.      SNF updates:  12/3: X ray on 12/3 - negative.   12/11: increase melatonin from 3 to 6, stop oxybutynin, change tylenol to 1000 bid and bid prn, guaic stools x 3, start Protonix 40 bid and HGB recheck next lab day    Today he notes he is sleeping slightly better and having less pain.   Notable slight increase in HR listed today. Noted still on Prednisone 10 mg pq day.     PMH/PSH reviewed in EPIC today  REVIEW OF SYSTEMS:  4 point ROS including Respiratory, CV, GI and , other than that noted in the HPI,  is negative    /67   Pulse 101   Temp 97.2  F (36.2  C)   Resp 19   Ht 1.676 m (5' 6\")   Wt 67.3 kg (148 lb 4.8 oz)   SpO2 96%   BMI 23.94 kg/m    GENERAL APPEARANCE:  Alert, in no distress, still appears quite weak/frail  RESP:  respiratory effort and palpation of chest normal, auscultation of lungs clear , no respiratory distress  CV:  Palpation and auscultation of heart done , rate and rhythm reg, no murmur, no peripheral edema  ABDOMEN:  normal bowel sounds, soft, nontender, no hepatosplenomegaly or other masses  M/S:   Gait and station weak - unsteady - 4 WW, Digits and nails intact  SKIN:  Inspection and Palpation of skin and subcutaneous tissue intact  NEURO: 2-12 in normal limits and at patient's baseline  PSYCH:  insight and " judgement, memory intact , affect and mood Pleasant     Hemoglobin   Date Value Ref Range Status   12/10/2020 8.2 (L) 13.3 - 17.7 g/dL Final         Assessment/Plan:  Muscle weakness (generalized) and Hip pain, left  Noted [facility] recommendation for increase Pred - but given GI loss and suspect GI bleed - wont' increase now, but keep at 10 -  Does appear improved given scheduled Tyelnnol and continued PT    Primary insomnia  Slightly improved with Tyelenol and Melatonin - cont    OAB (overactive bladder)  No s/e of stopping ditropan - keep off given risks    Anemia, unspecified type  Worrisome - will recehck - keep PPI on   If drops further - would stop/wean prednisone.     Type 2 diabetes mellitus without complication, without long-term current use of insulin (H)  Noted [id number] lower than needed, but not on medications - can stop checks    Centrilobular emphysema (H)  Improved - could stop prednisone if needed due to GI loss  - will f/u on Friday    No new orders today but f/u on Friday and assess risk vs Benefit of prednisone given next HGB/ guaic results.     Electronically signed by:  JAYELNE Celaya CNP

## 2020-12-17 ENCOUNTER — HOSPITAL LABORATORY (OUTPATIENT)
Facility: OTHER | Age: 85
End: 2020-12-17

## 2020-12-17 LAB — HGB BLD-MCNC: 8.7 G/DL (ref 13.3–17.7)

## 2020-12-18 ENCOUNTER — NURSING HOME VISIT (OUTPATIENT)
Dept: GERIATRICS | Facility: CLINIC | Age: 85
End: 2020-12-18
Payer: MEDICARE

## 2020-12-18 VITALS
WEIGHT: 148.3 LBS | RESPIRATION RATE: 16 BRPM | HEIGHT: 66 IN | BODY MASS INDEX: 23.83 KG/M2 | TEMPERATURE: 97.5 F | HEART RATE: 94 BPM | DIASTOLIC BLOOD PRESSURE: 75 MMHG | OXYGEN SATURATION: 95 % | SYSTOLIC BLOOD PRESSURE: 150 MMHG

## 2020-12-18 DIAGNOSIS — D64.9 ANEMIA, UNSPECIFIED TYPE: ICD-10-CM

## 2020-12-18 DIAGNOSIS — E11.9 TYPE 2 DIABETES MELLITUS WITHOUT COMPLICATION, WITHOUT LONG-TERM CURRENT USE OF INSULIN (H): ICD-10-CM

## 2020-12-18 DIAGNOSIS — M62.81 MUSCLE WEAKNESS (GENERALIZED): ICD-10-CM

## 2020-12-18 DIAGNOSIS — U07.1 CLINICAL DIAGNOSIS OF COVID-19: ICD-10-CM

## 2020-12-18 DIAGNOSIS — N32.81 OAB (OVERACTIVE BLADDER): ICD-10-CM

## 2020-12-18 DIAGNOSIS — J43.2 CENTRILOBULAR EMPHYSEMA (H): Primary | ICD-10-CM

## 2020-12-18 DIAGNOSIS — M25.552 HIP PAIN, LEFT: ICD-10-CM

## 2020-12-18 DIAGNOSIS — F51.01 PRIMARY INSOMNIA: ICD-10-CM

## 2020-12-18 PROCEDURE — 99316 NF DSCHRG MGMT 30 MIN+: CPT | Performed by: NURSE PRACTITIONER

## 2020-12-18 ASSESSMENT — MIFFLIN-ST. JEOR: SCORE: 1275.43

## 2020-12-18 NOTE — LETTER
12/18/2020        RE: Magno Blackmon  [address]  [place] [address]        [place] [facility] DISCHARGE SUMMARY  PATIENT'S NAME: Magno Blackmon  YOB: 1930  MEDICAL RECORD NUMBER:  [id number]  Place of Service where encounter took place:  [facility] [[id number]]    PRIMARY CARE PROVIDER AND CLINIC RESPONSIBLE AFTER TRANSFER:   [facility]  [address]  [place] [address]   Non-[facility] Provider     Transferring providers: JAYLENE Celaya CNP, Isha Lopez MD  Recent Hospitalization/ED: [facility] hospital stay 11/28 - 11/30    Date of SNF Admission: 11/28/1010  Date of SNF (anticipated) Discharge: December / 22 / 2020  Discharged to: previous independent home  Physical Function: up ambulating 50 ft.   DME: none needed.   CODE STATUS/ADVANCE DIRECTIVES DISCUSSION:  Full Code  (initially wanted DNr/DNI - now changed to full code)  ALLERGIES: Blood-group specific substance    DISCHARGE DIAGNOSIS/NURSING FACILITY COURSE:      Bill is rehabbing s/p [facility] hospital stay 11/28 - 11/30 for generalized weakness (+ COVID 11/17 and [facility] hospitalization for pneumonia 11/16 - 11/18. Noted from hospital he did not need O2, but wearing O2 now. He states Chronic O2 at night. He wants to go home ASAP, but notes he is weaker than he should be. Noted fall on 12/2 at SNF> X ray done of hip negative.      SNF updates:  12/3: X ray on 12/3 - negative.   12/11: increase melatonin from 3 to 6, stop oxybutynin, change tylenol to 1000 bid and bid prn, guaic stools x 3, start Protonix 40 bid and HGB recheck next lab day  12/18: HGB recheck 8.7, increase tylenol 1000 TID, discontinue oxycodone (no use at SNF); decrease prednisone from 10 daily to 5 MWF and 10 all other days.       Centrilobular emphysema (H) and Clinical diagnosis of COVID-19  Noted stable off nebs (due to facility protocol due to COVID) and on symbicort - ok to continue this and f/u with PCP.   Unsure of  hx of Prednisone. He tells me he was on it before the hospital stay due to lungs, but I do not see that in Epic.   I will trial GDR given risk of GI   F/u with PCP with Prednisone reduction if able.     Muscle weakness (generalized)  Slow improvement - Home care PT difficult /shortage given COVID - encourage ongoing self directed exercises at home.     Hip pain, left  X ray at SNF - no trauma/fracture. Treated with Tylenol and PT.     OAB (overactive bladder)  Stopped ditropan given risks - he seems to be doing well off of it    Primary insomnia  Treated with melatonin and doing well    Anemia, unspecified type  worrisome given prednisone of GI loss. Unable to get guiacs, but PPI started and HGB stable   recommend cont PPI and f/u with PCP  Hemoglobin   Date Value Ref Range Status   12/17/2020 8.7 (L) 13.3 - 17.7 g/dL Final   12/10/2020 8.2 (L) 13.3 - 17.7 g/dL Final     Type 2 diabetes mellitus without complication, without long-term current use of insulin (H)  Remains on metformin .     HTN  BP goals are ~130 -165/60 -90 mmHg.This is higher than ACC and AHA recommendations due to risk for hypotension, risk of dizziness and falls, risk of tissue/cerebral hypoperfusion, frailty and Adan et al (2018) found that sBPs greater than 170 had improved mortality and improved cognitive retention over sBPs under 140 in patients 85 years and older. Patient is stable with current plan of care and routine assessment.    Past Medical History: reviewed.     Discharge Medications:    Current Outpatient Medications   Medication Sig Dispense Refill     acetaminophen (TYLENOL) 500 MG tablet Take 1,000 mg by mouth 3 times daily And q day prn       albuterol (PROAIR HFA/PROVENTIL HFA/VENTOLIN HFA) 108 (90 Base) MCG/ACT inhaler Inhale 2 puffs into the lungs 4 times daily as needed for shortness of breath / dyspnea or wheezing       budesonide-formoterol (SYMBICORT) 160-4.5 MCG/ACT Inhaler Inhale 2 puffs into the lungs 2 times daily    "    calcium carbonate 600 mg-vitamin D 400 units (CALTRATE) 600-400 MG-UNIT per tablet Take 1 tablet by mouth 2 times daily       carboxymethylcellulose PF (REFRESH PLUS) 0.5 % ophthalmic solution Place 1 drop into both eyes as needed for dry eyes       Cholecalciferol (VITAMIN D-3) 125 MCG (5000 UT) TABS Take 5,000 Units by mouth daily       diltiazem ER (DILT-XR) 180 MG 24 hr capsule Take 180 mg by mouth daily       guaiFENesin (MUCINEX) 600 MG 12 hr tablet Take 600 mg by mouth 2 times daily        melatonin 3 MG tablet Take 6 mg by mouth At Bedtime        metFORMIN (GLUCOPHAGE) 500 MG tablet Take 500 mg by mouth 2 times daily (with meals)       Multiple Vitamins-Minerals (PRESERVISION AREDS 2 PO) 2 capsules by mouth once daily       predniSONE (DELTASONE) 10 MG tablet Take 10 mg by mouth four times a week And 5 mg MWF       senna (SENOKOT) 8.6 MG tablet Take 1 tablet by mouth 2 times daily as needed for constipation       simvastatin (ZOCOR) 10 MG tablet Take 10 mg by mouth At Bedtime       tamsulosin (FLOMAX) 0.4 MG capsule Take 0.4 mg by mouth daily         Medication Changes/Rationale:     See above in HPI    Controlled medications sent with patient:   none     ROS:   4 point ROS including Respiratory, CV, GI and , other than that noted in the HPI,  is negative    Physical Exam:   Vitals: BP (!) 150/75   Pulse 94   Temp 97.5  F (36.4  C)   Resp 16   Ht 1.676 m (5' 6\")   Wt 67.3 kg (148 lb 4.8 oz)   SpO2 95%   BMI 23.94 kg/m    BMI= Body mass index is 23.94 kg/m .  GENERAL APPEARANCE:  Alert, in no distress  RESP:  respiratory effort and palpation of chest normal, auscultation of lungs diminished, but clear , no respiratory distress  CV:  Palpation and auscultation of heart done , rate and rhythm reg , no murmur, no peripheral edema  ABDOMEN:  normal bowel sounds, soft, nontender, no hepatosplenomegaly or other masses  M/S:   Gait and station SBA/independent, Digits and nails intact  SKIN:  Inspection " and Palpation of skin and subcutaneous tissue intact  NEURO: 2-12 in normal limits and at patient's baseline  PSYCH:  insight and judgement, memory intact , affect and mood Pleasant     SNF labs: Labs done in SNF are in [place] EPIC. Please refer to them using EPIC/Care Everywhere.  Hemoglobin   Date Value Ref Range Status   12/17/2020 8.7 (L) 13.3 - 17.7 g/dL Final   12/10/2020 8.2 (L) 13.3 - 17.7 g/dL Final     DISCHARGE PLAN:    Follow up labs:  Recommend f/du with PCP and HGB recheck if sympomtatic    Medical Follow Up:        Follow up with primary care provider in 1-2 weeks; eval prednisone taper, HGB , PPI use, and need for nebs vs symbicort. No therapy or home care recommended. MTM referral needed and placed by this provider: No    Discharge Services: No therapy or home care recommended. /limited availability due to COVID.     TOTAL DISCHARGE TIME:   Greater than 30 minutes  Electronically signed by:  JAYLENE Celaya CNP                             Sincerely,        JAYLENE Celaya CNP

## 2020-12-18 NOTE — PROGRESS NOTES
[facility] DISCHARGE SUMMARY  PATIENT'S NAME: Magno Blackmon  YOB: 1930  MEDICAL RECORD NUMBER:  [id number]  Place of Service where encounter took place:  [facility] ([facility]) [[id number]]    PRIMARY CARE PROVIDER AND CLINIC RESPONSIBLE AFTER TRANSFER:   [facility]  [address]  [place] [address]   Non-[facility] Provider     Transferring providers: JAYLENE Celaya CNP, Isha Lopez MD  Recent Hospitalization/ED: [facility] hospital stay 11/28 - 11/30    Date of SNF Admission: 11/28/1010  Date of SNF (anticipated) Discharge: December / 22 / 2020  Discharged to: previous independent home  Physical Function: up ambulating 50 ft.   DME: none needed.   CODE STATUS/ADVANCE DIRECTIVES DISCUSSION:  Full Code  (initially wanted DNr/DNI - now changed to full code)  ALLERGIES: Blood-group specific substance    DISCHARGE DIAGNOSIS/NURSING FACILITY COURSE:      Bill is rehabbing s/p [facility] hospital stay 11/28 - 11/30 for generalized weakness (+ COVID 11/17 and [facility] hospitalization for pneumonia 11/16 - 11/18. Noted from hospital he did not need O2, but wearing O2 now. He states Chronic O2 at night. He wants to go home ASAP, but notes he is weaker than he should be. Noted fall on 12/2 at SNF> X ray done of hip negative.      SNF updates:  12/3: X ray on 12/3 - negative.   12/11: increase melatonin from 3 to 6, stop oxybutynin, change tylenol to 1000 bid and bid prn, guaic stools x 3, start Protonix 40 bid and HGB recheck next lab day  12/18: HGB recheck 8.7, increase tylenol 1000 TID, discontinue oxycodone (no use at SNF); decrease prednisone from 10 daily to 5 MWF and 10 all other days.       Centrilobular emphysema (H) and Clinical diagnosis of COVID-19  Noted stable off nebs (due to facility protocol due to COVID) and on symbicort - ok to continue this and f/u with PCP.   Unsure of hx of Prednisone. He tells me he was on it before the hospital stay due to lungs, but  I do not see that in Epic.   I will trial GDR given risk of GI   F/u with PCP with Prednisone reduction if able.     Muscle weakness (generalized)  Slow improvement - Home care PT difficult /shortage given COVID - encourage ongoing self directed exercises at home.     Hip pain, left  X ray at [facility] - no trauma/fracture. Treated with Tylenol and PT.     OAB (overactive bladder)  Stopped ditropan given risks - he seems to be doing well off of it    Primary insomnia  Treated with melatonin and doing well    Anemia, unspecified type  worrisome given prednisone of GI loss. Unable to get guiacs, but PPI started and HGB stable   recommend cont PPI and f/u with PCP  Hemoglobin   Date Value Ref Range Status   12/17/2020 8.7 (L) 13.3 - 17.7 g/dL Final   12/10/2020 8.2 (L) 13.3 - 17.7 g/dL Final     Type 2 diabetes mellitus without complication, without long-term current use of insulin (H)  Remains on metformin .     HTN  BP goals are ~130 -165/60 -90 mmHg.This is higher than ACC and AHA recommendations due to risk for hypotension, risk of dizziness and falls, risk of tissue/cerebral hypoperfusion, frailty and Adan et al (2018) found that sBPs greater than 170 had improved mortality and improved cognitive retention over sBPs under 140 in patients 85 years and older. Patient is stable with current plan of care and routine assessment.    Past Medical History: reviewed.     Discharge Medications:    Current Outpatient Medications   Medication Sig Dispense Refill     acetaminophen (TYLENOL) 500 MG tablet Take 1,000 mg by mouth 3 times daily And q day prn       albuterol (PROAIR HFA/PROVENTIL HFA/VENTOLIN HFA) 108 (90 Base) MCG/ACT inhaler Inhale 2 puffs into the lungs 4 times daily as needed for shortness of breath / dyspnea or wheezing       budesonide-formoterol (SYMBICORT) 160-4.5 MCG/ACT Inhaler Inhale 2 puffs into the lungs 2 times daily       calcium carbonate 600 mg-vitamin D 400 units (CALTRATE) 600-400 MG-UNIT per tablet  "Take 1 tablet by mouth 2 times daily       carboxymethylcellulose PF (REFRESH PLUS) 0.5 % ophthalmic solution Place 1 drop into both eyes as needed for dry eyes       Cholecalciferol (VITAMIN D-3) 125 MCG (5000 UT) TABS Take 5,000 Units by mouth daily       diltiazem ER (DILT-XR) 180 MG 24 hr capsule Take 180 mg by mouth daily       guaiFENesin (MUCINEX) 600 MG 12 hr tablet Take 600 mg by mouth 2 times daily        melatonin 3 MG tablet Take 6 mg by mouth At Bedtime        metFORMIN (GLUCOPHAGE) 500 MG tablet Take 500 mg by mouth 2 times daily (with meals)       Multiple Vitamins-Minerals (PRESERVISION AREDS 2 PO) 2 capsules by mouth once daily       predniSONE (DELTASONE) 10 MG tablet Take 10 mg by mouth four times a week And 5 mg MWF       senna (SENOKOT) 8.6 MG tablet Take 1 tablet by mouth 2 times daily as needed for constipation       simvastatin (ZOCOR) 10 MG tablet Take 10 mg by mouth At Bedtime       tamsulosin (FLOMAX) 0.4 MG capsule Take 0.4 mg by mouth daily         Medication Changes/Rationale:     See above in HPI    Controlled medications sent with patient:   none     ROS:   4 point ROS including Respiratory, CV, GI and , other than that noted in the HPI,  is negative    Physical Exam:   Vitals: BP (!) 150/75   Pulse 94   Temp 97.5  F (36.4  C)   Resp 16   Ht 1.676 m (5' 6\")   Wt 67.3 kg (148 lb 4.8 oz)   SpO2 95%   BMI 23.94 kg/m    BMI= Body mass index is 23.94 kg/m .  GENERAL APPEARANCE:  Alert, in no distress  RESP:  respiratory effort and palpation of chest normal, auscultation of lungs diminished, but clear , no respiratory distress  CV:  Palpation and auscultation of heart done , rate and rhythm reg , no murmur, no peripheral edema  ABDOMEN:  normal bowel sounds, soft, nontender, no hepatosplenomegaly or other masses  M/S:   Gait and station SBA/independent, Digits and nails intact  SKIN:  Inspection and Palpation of skin and subcutaneous tissue intact  NEURO: 2-12 in normal limits and " at patient's baseline  PSYCH:  insight and judgement, memory intact , affect and mood Pleasant     SNF labs: Labs done in SNF are in [facility] EPIC. Please refer to them using [employer] Everywhere.  Hemoglobin   Date Value Ref Range Status   12/17/2020 8.7 (L) 13.3 - 17.7 g/dL Final   12/10/2020 8.2 (L) 13.3 - 17.7 g/dL Final     DISCHARGE PLAN:    Follow up labs:  Recommend f/du with PCP and HGB recheck if sympomtatic    Medical Follow Up:        Follow up with primary care provider in 1-2 weeks; eval prednisone taper, HGB , PPI use, and need for nebs vs symbicort. No therapy or home care recommended. MTM referral needed and placed by this provider: No    Discharge Services: No therapy or home care recommended. /limited availability due to COVID.     TOTAL DISCHARGE TIME:   Greater than 30 minutes  Electronically signed by:  JAYLENE Celaya CNP